# Patient Record
Sex: MALE | Race: OTHER | NOT HISPANIC OR LATINO | URBAN - METROPOLITAN AREA
[De-identification: names, ages, dates, MRNs, and addresses within clinical notes are randomized per-mention and may not be internally consistent; named-entity substitution may affect disease eponyms.]

---

## 2017-05-24 ENCOUNTER — INPATIENT (INPATIENT)
Facility: HOSPITAL | Age: 32
LOS: 1 days | Discharge: HOME | DRG: 103 | End: 2017-05-26
Attending: INTERNAL MEDICINE | Admitting: INTERNAL MEDICINE
Payer: COMMERCIAL

## 2017-05-24 VITALS
HEIGHT: 76.5 IN | WEIGHT: 279.99 LBS | RESPIRATION RATE: 18 BRPM | HEART RATE: 65 BPM | SYSTOLIC BLOOD PRESSURE: 143 MMHG | OXYGEN SATURATION: 97 % | DIASTOLIC BLOOD PRESSURE: 84 MMHG | TEMPERATURE: 98 F

## 2017-05-24 LAB
ALBUMIN SERPL ELPH-MCNC: 4.2 G/DL — SIGNIFICANT CHANGE UP (ref 3.3–5)
ALP SERPL-CCNC: 65 U/L — SIGNIFICANT CHANGE UP (ref 40–120)
ALT FLD-CCNC: 22 U/L — SIGNIFICANT CHANGE UP (ref 10–45)
ANION GAP SERPL CALC-SCNC: 11 MMOL/L — SIGNIFICANT CHANGE UP (ref 5–17)
APTT BLD: 30.6 SEC — SIGNIFICANT CHANGE UP (ref 27.5–37.4)
AST SERPL-CCNC: 23 U/L — SIGNIFICANT CHANGE UP (ref 10–40)
BASOPHILS NFR BLD AUTO: 0 % — SIGNIFICANT CHANGE UP (ref 0–2)
BILIRUB SERPL-MCNC: 0.6 MG/DL — SIGNIFICANT CHANGE UP (ref 0.2–1.2)
BUN SERPL-MCNC: 15 MG/DL — SIGNIFICANT CHANGE UP (ref 7–23)
CALCIUM SERPL-MCNC: 9.8 MG/DL — SIGNIFICANT CHANGE UP (ref 8.4–10.5)
CHLORIDE SERPL-SCNC: 99 MMOL/L — SIGNIFICANT CHANGE UP (ref 96–108)
CHOLEST SERPL-MCNC: 183 MG/DL — SIGNIFICANT CHANGE UP (ref 10–199)
CO2 SERPL-SCNC: 27 MMOL/L — SIGNIFICANT CHANGE UP (ref 22–31)
CREAT SERPL-MCNC: 1.1 MG/DL — SIGNIFICANT CHANGE UP (ref 0.5–1.3)
EOSINOPHIL NFR BLD AUTO: 5.9 % — SIGNIFICANT CHANGE UP (ref 0–6)
GLUCOSE SERPL-MCNC: 89 MG/DL — SIGNIFICANT CHANGE UP (ref 70–99)
HCT VFR BLD CALC: 39.8 % — SIGNIFICANT CHANGE UP (ref 39–50)
HDLC SERPL-MCNC: 49 MG/DL — SIGNIFICANT CHANGE UP (ref 40–125)
HGB BLD-MCNC: 13.6 G/DL — SIGNIFICANT CHANGE UP (ref 13–17)
INR BLD: 1 — SIGNIFICANT CHANGE UP (ref 0.88–1.16)
LIPID PNL WITH DIRECT LDL SERPL: 87 MG/DL — SIGNIFICANT CHANGE UP
LYMPHOCYTES # BLD AUTO: 35.5 % — SIGNIFICANT CHANGE UP (ref 13–44)
MCHC RBC-ENTMCNC: 26.5 PG — LOW (ref 27–34)
MCHC RBC-ENTMCNC: 34.2 G/DL — SIGNIFICANT CHANGE UP (ref 32–36)
MCV RBC AUTO: 77.4 FL — LOW (ref 80–100)
MONOCYTES NFR BLD AUTO: 12 % — SIGNIFICANT CHANGE UP (ref 2–14)
NEUTROPHILS NFR BLD AUTO: 46.6 % — SIGNIFICANT CHANGE UP (ref 43–77)
PLATELET # BLD AUTO: 177 K/UL — SIGNIFICANT CHANGE UP (ref 150–400)
POTASSIUM SERPL-MCNC: 4.1 MMOL/L — SIGNIFICANT CHANGE UP (ref 3.5–5.3)
POTASSIUM SERPL-SCNC: 4.1 MMOL/L — SIGNIFICANT CHANGE UP (ref 3.5–5.3)
PROT SERPL-MCNC: 7 G/DL — SIGNIFICANT CHANGE UP (ref 6–8.3)
PROTHROM AB SERPL-ACNC: 11.1 SEC — SIGNIFICANT CHANGE UP (ref 9.8–12.7)
RBC # BLD: 5.14 M/UL — SIGNIFICANT CHANGE UP (ref 4.2–5.8)
RBC # FLD: 13.3 % — SIGNIFICANT CHANGE UP (ref 10.3–16.9)
SODIUM SERPL-SCNC: 137 MMOL/L — SIGNIFICANT CHANGE UP (ref 135–145)
TOTAL CHOLESTEROL/HDL RATIO MEASUREMENT: 3.7 RATIO — SIGNIFICANT CHANGE UP (ref 3.4–9.6)
TRIGL SERPL-MCNC: 237 MG/DL — HIGH (ref 10–149)
TROPONIN T SERPL-MCNC: <0.01 NG/ML — SIGNIFICANT CHANGE UP (ref 0–0.01)
WBC # BLD: 3.6 K/UL — LOW (ref 3.8–10.5)
WBC # FLD AUTO: 3.6 K/UL — LOW (ref 3.8–10.5)

## 2017-05-24 PROCEDURE — 70496 CT ANGIOGRAPHY HEAD: CPT | Mod: 26

## 2017-05-24 PROCEDURE — 71010: CPT | Mod: 26

## 2017-05-24 PROCEDURE — 99291 CRITICAL CARE FIRST HOUR: CPT | Mod: 25

## 2017-05-24 PROCEDURE — 99291 CRITICAL CARE FIRST HOUR: CPT

## 2017-05-24 PROCEDURE — 70498 CT ANGIOGRAPHY NECK: CPT | Mod: 26

## 2017-05-24 PROCEDURE — 93010 ELECTROCARDIOGRAM REPORT: CPT

## 2017-05-24 RX ORDER — ALTEPLASE 100 MG
81 KIT INTRAVENOUS ONCE
Qty: 0 | Refills: 0 | Status: COMPLETED | OUTPATIENT
Start: 2017-05-24 | End: 2017-05-24

## 2017-05-24 RX ORDER — ALTEPLASE 100 MG
9 KIT INTRAVENOUS ONCE
Qty: 0 | Refills: 0 | Status: COMPLETED | OUTPATIENT
Start: 2017-05-24 | End: 2017-05-24

## 2017-05-24 RX ADMIN — ALTEPLASE 540 MILLIGRAM(S): KIT at 14:35

## 2017-05-24 RX ADMIN — ALTEPLASE 81 MILLIGRAM(S): KIT at 14:38

## 2017-05-24 NOTE — ED PROVIDER NOTE - MEDICAL DECISION MAKING DETAILS
30 yo male right handed male with LUE drift weakness x 30 min - prior CVA Dec 2016  seen by stroke team  neg CT CTA  will proceed with TPA  pt undertands risk and possible compls  wife at bedside

## 2017-05-24 NOTE — ED ADULT NURSE REASSESSMENT NOTE - GASTROINTESTINAL WDL
Abdomen soft, nontender, nondistended

## 2017-05-24 NOTE — ED PROVIDER NOTE - CRANIAL NERVE AND PUPILLARY EXAM
central vision intact/extra-ocular movements intact/gag reflex intact/tongue is midline/peripheral vision intact/cranial nerves 2-12 intact

## 2017-05-24 NOTE — ED ADULT NURSE REASSESSMENT NOTE - INTEGUMENTARY WDL
Color consistent with ethnicity/race, warm, dry intact, resilient.

## 2017-05-24 NOTE — ED PROVIDER NOTE - CRITICAL CARE PROVIDED
consult w/ pt's family directly relating to pts condition/consultation with other physicians/direct patient care (not related to procedure)/documentation/interpretation of diagnostic studies/additional history taking
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
## 2017-05-24 NOTE — H&P ADULT - NSHPSOCIALHISTORY_GEN_ALL_CORE
former smoker (quit 8 years ago, smoked for 5 years), drinks 1-2 times per week, typically Pamela. Per girlfriend at bedside, he drinks about 1 pint each time. Last drink Monday night.

## 2017-05-24 NOTE — ED ADULT NURSE REASSESSMENT NOTE - NURSING NEURO ORIENTATION
oriented to person, place and time

## 2017-05-24 NOTE — ED ADULT NURSE REASSESSMENT NOTE - NURSING NEURO LEVEL OF CONSCIOUSNESS
alert and awake/follows commands
follows commands/alert and awake
alert and awake/follows commands
follows commands/alert and awake
follows commands/alert and awake

## 2017-05-24 NOTE — H&P ADULT - NSHPPHYSICALEXAM_GEN_ALL_CORE
ICU Vital Signs Last 24 Hrs  T(F): 97.3, Max: 97.5 (05-24 @ 13:57)  HR: 66 (48 - 66)  BP: 107/62 (102/56 - 143/84)  BP(mean): 78 (78 - 95)  RR: 38 (16 - 38)  SpO2: 97% (95% - 100%)      General: NAD, comfortable laying in bed  HEENT: NCAT, PERRL, clear conjunctiva, no scleral icterus  Neck: supple, no JVD  Respiratory: CTA b/l, no wheezing, rhonchi, rales  Cardiovascular: RRR, normal S1S2, no M/R/G  Abdomen: soft, NT/ND, bowel sounds in all four quadrants, no palpable masses  Extremities: WWP, no clubbing, cyanosis, or edema  Neuro: ICU Vital Signs Last 24 Hrs  T(F): 97.3, Max: 97.5 (05-24 @ 13:57)  HR: 66 (48 - 66)  BP: 107/62 (102/56 - 143/84)  BP(mean): 78 (78 - 95)  RR: 38 (16 - 38)  SpO2: 97% (95% - 100%)      General: NAD, comfortable laying in bed  HEENT: NCAT, PERRL, clear conjunctiva, no scleral icterus  Neck: supple, no JVD  Respiratory: CTA b/l, no wheezing, rhonchi, rales  Cardiovascular: RRR, normal S1S2, no M/R/G  Abdomen: soft, NT/ND, bowel sounds in all four quadrants, no palpable masses  Extremities: WWP, no clubbing, cyanosis, or edema  Neuro: A&Ox3, strength 5/5 equal bilaterally arms/legs, mild left facial numbness (no weakness), mild left arm numbness (no weakness), left leg numbness resolved, finger to nose intact
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
## 2017-05-24 NOTE — ED ADULT NURSE REASSESSMENT NOTE - RESPIRATORY WDL
Breathing spontaneous and unlabored, equal bilaterally with regular rhythm.

## 2017-05-24 NOTE — H&P ADULT - NSHPLABSRESULTS_GEN_ALL_CORE
13.6   3.6   )-----------( 177      ( 24 May 2017 14:28 )             39.8     05-24    137  |  99  |  15  ----------------------------<  89  4.1   |  27  |  1.10    Ca    9.8      24 May 2017 14:28    TPro  7.0  /  Alb  4.2  /  TBili  0.6  /  DBili  x   /  AST  23  /  ALT  22  /  AlkPhos  65  05-24    PT/INR - ( 24 May 2017 14:28 )   PT: 11.1 sec;   INR: 1.00          PTT - ( 24 May 2017 14:28 )  PTT:30.6 sec
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
## 2017-05-24 NOTE — CONSULT NOTE ADULT - ATTENDING COMMENTS
Patient seen and examined with Resident.  Agree with above.    Patient presented with acute onset of left sided numbness that may be stroke so tpa given after discussion with patient regarding risks and benefits of tPA even if he actually having seizure or complicated migraine.  There was no actual shaking, urine or bowel incontinence or tongue biting to signify seizure.  He did have headache but it was non-specific.  - post tpa protocol.  - will obtain additional information from [facility] regarding his previous episode to determine what further testing is needed.

## 2017-05-24 NOTE — ED ADULT NURSE NOTE - OBJECTIVE STATEMENT
Pt presents to ED c/o numbness. Pt with PMH seizure vs stroke in January at [facility] reports onset of L hand and L foot numbness today at approx 1230 per staff at MD Acosta's office while pt was undergoing cognitive therapy. Pt arrival to ED at 1400, pt upgraded to MD Clark, noted to have mild sensation changes on L side, stroke code called 1408. See nurse reassessment note for stroke code details. At present time pt receiving tPA drip over 1 hour, remains on continuous cardiac monitor, stroke team at bedside. Pt presented with 3/10 HA, at this time pt reports no increase in HA, pt educated about S&S to watch for during tPA administration including worsening HA, nausea, lethargy. Will continue to monitor. Notably pt not on home meds at this time, states "I was supposed to take something when I left the hospital for seizure but I haven't taken it. I don't remember what it was called."

## 2017-05-24 NOTE — ED ADULT NURSE REASSESSMENT NOTE - NS ED NURSE REASSESS COMMENT FT1
1435 MD Dean at bedside, to give tPA. 9mg IVP bolus given at 1435, 81 mg drip hung 1438 by RUTHIE Garcia. Pt lying flat on continuous cardiac monitor at this time. Pt educated about S&S to watch out for including worsening HA, nausea, difficulty swallowing. Will continue to monitor.
Pt arrival to ED at 1400 reporting onset of L sided tingling in L foot and L hand PTA while doing "cognitive testing at Dr. Acosta's office." On arrival to ED pt reporting decreased L sided sensation to LUE and LLE. Per pt "I was seen at [facility] in January and they told me I had a seizure and a stroke so I'm not sure." Stroke code called 1408, FS 90, stroke team arrival 1409, pt transported to CT 1411, CT non contrast preformed, 18G IV placed by RUTHIE Velázquez and labs drawn and sent crisis at 1420, CTA preformed. Pt brought back to [facility] 2 at 1425, being placed on continuous cardiac monitor at this time. EKG in progress. MD Dean and stroke team at bedside at this time obtaining further history from girlfriend and pt, to determine if to give tPA. Pt in NAD at this time, VSS. Will continue to monitor.
Pt reporting increased blurry vision and dizziness while on tPA, MD Clark and MD Dean aware. Per MD Dean to continue tPA administration. Pt with increased sensation to LUE and LLE, states "it was about 75% before now it's up to 95% in my arm and leg." Pt remains A/Ox3 and PERRLA, no further motor or sensor changes. Family at bedside, pt pending tele bed assignment. Will continue to monitor.
tPA administration complete. Pt remains A/Ox3, PERRLA, motor and sensation fully intact x4 extremities. Pt educated about lying flat post tPA administration. Pt remains on continous cardiac monitor pending clean 7 east bed. Will continue to monitor.
Pt remains A/Ox3, PERRLA, motor and sensation in tact x4 extremities. Pt with clean bed on 7 East, to call report and prepare for transport.
Pt remains with 3/10 HA, reports blurry vision has somewhat resolved since onset. Pt states "my LUE and RUE feel the same now, and my LLE and RLE feel the same." Stroke team at bedside at this time. Pt pending 7East bed assignment. Will continue to monitor.

## 2017-05-24 NOTE — ED ADULT NURSE REASSESSMENT NOTE - PSYCHOSOCIAL WDL
Alert and oriented x 3, normal mood and affect, no apparent risk to self or others.

## 2017-05-24 NOTE — ED ADULT NURSE REASSESSMENT NOTE - GENITOURINARY WDL
Bladder non-tender and non-distended. Urine clear yellow.

## 2017-05-24 NOTE — ED ADULT TRIAGE NOTE - ARRIVAL INFO ADDITIONAL COMMENTS
Pt is A&O x 3, pt is able to speak coherently in full sentences and ambulate with steady gait. No facial droop. left side weakness noted. Pt presented to Ed with left side numbness x 30min. Pt is A&O x 3, pt is able to speak coherently in full sentences and ambulate with steady gait. No facial droop. left side weakness noted.

## 2017-05-25 LAB
ANION GAP SERPL CALC-SCNC: 15 MMOL/L — SIGNIFICANT CHANGE UP (ref 5–17)
BLD GP AB SCN SERPL QL: NEGATIVE — SIGNIFICANT CHANGE UP
BLD GP AB SCN SERPL QL: NEGATIVE — SIGNIFICANT CHANGE UP
BUN SERPL-MCNC: 14 MG/DL — SIGNIFICANT CHANGE UP (ref 7–23)
CALCIUM SERPL-MCNC: 9.2 MG/DL — SIGNIFICANT CHANGE UP (ref 8.4–10.5)
CHLORIDE SERPL-SCNC: 100 MMOL/L — SIGNIFICANT CHANGE UP (ref 96–108)
CHOLEST SERPL-MCNC: 186 MG/DL — SIGNIFICANT CHANGE UP (ref 10–199)
CO2 SERPL-SCNC: 24 MMOL/L — SIGNIFICANT CHANGE UP (ref 22–31)
CREAT SERPL-MCNC: 1 MG/DL — SIGNIFICANT CHANGE UP (ref 0.5–1.3)
CRP SERPL-MCNC: 0.1 MG/DL — SIGNIFICANT CHANGE UP (ref 0–0.4)
GLUCOSE SERPL-MCNC: 126 MG/DL — HIGH (ref 70–99)
HBA1C BLD-MCNC: 5.3 % — SIGNIFICANT CHANGE UP (ref 4–5.6)
HCT VFR BLD CALC: 42.9 % — SIGNIFICANT CHANGE UP (ref 39–50)
HDLC SERPL-MCNC: 50 MG/DL — SIGNIFICANT CHANGE UP (ref 40–125)
HGB BLD-MCNC: 14.2 G/DL — SIGNIFICANT CHANGE UP (ref 13–17)
LIPID PNL WITH DIRECT LDL SERPL: 109 MG/DL — SIGNIFICANT CHANGE UP
MAGNESIUM SERPL-MCNC: 2.2 MG/DL — SIGNIFICANT CHANGE UP (ref 1.6–2.6)
MCHC RBC-ENTMCNC: 25.6 PG — LOW (ref 27–34)
MCHC RBC-ENTMCNC: 33.1 G/DL — SIGNIFICANT CHANGE UP (ref 32–36)
MCV RBC AUTO: 77.3 FL — LOW (ref 80–100)
PHOSPHATE SERPL-MCNC: 4 MG/DL — SIGNIFICANT CHANGE UP (ref 2.5–4.5)
PLATELET # BLD AUTO: 183 K/UL — SIGNIFICANT CHANGE UP (ref 150–400)
POTASSIUM SERPL-MCNC: 4.3 MMOL/L — SIGNIFICANT CHANGE UP (ref 3.5–5.3)
POTASSIUM SERPL-SCNC: 4.3 MMOL/L — SIGNIFICANT CHANGE UP (ref 3.5–5.3)
RBC # BLD: 5.55 M/UL — SIGNIFICANT CHANGE UP (ref 4.2–5.8)
RBC # FLD: 13.3 % — SIGNIFICANT CHANGE UP (ref 10.3–16.9)
RH IG SCN BLD-IMP: POSITIVE — SIGNIFICANT CHANGE UP
RH IG SCN BLD-IMP: POSITIVE — SIGNIFICANT CHANGE UP
SODIUM SERPL-SCNC: 139 MMOL/L — SIGNIFICANT CHANGE UP (ref 135–145)
TOTAL CHOLESTEROL/HDL RATIO MEASUREMENT: 3.7 RATIO — SIGNIFICANT CHANGE UP (ref 3.4–9.6)
TRIGL SERPL-MCNC: 136 MG/DL — SIGNIFICANT CHANGE UP (ref 10–149)
TSH SERPL-MCNC: 0.88 UIU/ML — SIGNIFICANT CHANGE UP (ref 0.35–4.94)
WBC # BLD: 3.2 K/UL — LOW (ref 3.8–10.5)
WBC # FLD AUTO: 3.2 K/UL — LOW (ref 3.8–10.5)

## 2017-05-25 PROCEDURE — 93306 TTE W/DOPPLER COMPLETE: CPT | Mod: 26

## 2017-05-25 PROCEDURE — 99233 SBSQ HOSP IP/OBS HIGH 50: CPT

## 2017-05-25 PROCEDURE — 70450 CT HEAD/BRAIN W/O DYE: CPT | Mod: 26

## 2017-05-25 RX ORDER — ASPIRIN/CALCIUM CARB/MAGNESIUM 324 MG
81 TABLET ORAL DAILY
Qty: 0 | Refills: 0 | Status: DISCONTINUED | OUTPATIENT
Start: 2017-05-25 | End: 2017-05-26

## 2017-05-25 RX ORDER — ATORVASTATIN CALCIUM 80 MG/1
80 TABLET, FILM COATED ORAL AT BEDTIME
Qty: 0 | Refills: 0 | Status: DISCONTINUED | OUTPATIENT
Start: 2017-05-25 | End: 2017-05-26

## 2017-05-25 RX ORDER — HEPARIN SODIUM 5000 [USP'U]/ML
5000 INJECTION INTRAVENOUS; SUBCUTANEOUS EVERY 8 HOURS
Qty: 0 | Refills: 0 | Status: DISCONTINUED | OUTPATIENT
Start: 2017-05-25 | End: 2017-05-26

## 2017-05-25 RX ORDER — ACETAMINOPHEN 500 MG
650 TABLET ORAL EVERY 6 HOURS
Qty: 0 | Refills: 0 | Status: DISCONTINUED | OUTPATIENT
Start: 2017-05-25 | End: 2017-05-26

## 2017-05-25 RX ADMIN — ATORVASTATIN CALCIUM 80 MILLIGRAM(S): 80 TABLET, FILM COATED ORAL at 22:43

## 2017-05-25 RX ADMIN — Medication 650 MILLIGRAM(S): at 22:46

## 2017-05-25 RX ADMIN — Medication 81 MILLIGRAM(S): at 22:56

## 2017-05-25 RX ADMIN — HEPARIN SODIUM 5000 UNIT(S): 5000 INJECTION INTRAVENOUS; SUBCUTANEOUS at 22:43
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
## 2017-05-25 NOTE — PHYSICAL THERAPY INITIAL EVALUATION ADULT - PERTINENT HX OF CURRENT PROBLEM, REHAB EVAL
30 yo male admitted for complaints of numbness LUE/LLE tPA administered in [facility] ED 5/24/17 2:35pm, Patient seen for PT follow up

## 2017-05-25 NOTE — PROGRESS NOTE ADULT - ATTENDING COMMENTS
Patient seen and examined with NP.  Agree with above.  New complaint late afternoon of blurry vision in both eyes.  Requested Ophthalmology consult but they aren't available at this time.  Patient in for MRI Brain without sabine that may be performed overnight so will monitor overnight.    If his symptoms improve, will discharge in AM with possibly outpatient studies.    Discussed compliance with his medications.
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
## 2017-05-25 NOTE — PHYSICAL THERAPY INITIAL EVALUATION ADULT - PLANNED THERAPY INTERVENTIONS, PT EVAL
gait training/balance training/stair negotiation/bed mobility training/transfer training/strengthening

## 2017-05-25 NOTE — PHYSICAL THERAPY INITIAL EVALUATION ADULT - MODALITIES TREATMENT COMMENTS
Visual fields intact all four quadrants; "H" test intact, facial muscle symmetrical, shoulder shrug intact, hearing intact to finger rub bilateral, denies visual disturbances and hypoesthesia all four extremities MRS #1

## 2017-05-26 VITALS — TEMPERATURE: 98 F

## 2017-05-26 LAB
ANION GAP SERPL CALC-SCNC: 12 MMOL/L — SIGNIFICANT CHANGE UP (ref 5–17)
B2 MICROGLOB SERPL-MCNC: 1.4 MG/L — SIGNIFICANT CHANGE UP (ref 0.8–2.2)
BUN SERPL-MCNC: 18 MG/DL — SIGNIFICANT CHANGE UP (ref 7–23)
CALCIUM SERPL-MCNC: 9.3 MG/DL — SIGNIFICANT CHANGE UP (ref 8.4–10.5)
CHLORIDE SERPL-SCNC: 101 MMOL/L — SIGNIFICANT CHANGE UP (ref 96–108)
CO2 SERPL-SCNC: 27 MMOL/L — SIGNIFICANT CHANGE UP (ref 22–31)
CREAT SERPL-MCNC: 1 MG/DL — SIGNIFICANT CHANGE UP (ref 0.5–1.3)
CRP SERPL-MCNC: 0.2 MG/DL — SIGNIFICANT CHANGE UP (ref 0–0.4)
ERYTHROCYTE [SEDIMENTATION RATE] IN BLOOD: 3 MM/HR — SIGNIFICANT CHANGE UP
GLUCOSE SERPL-MCNC: 93 MG/DL — SIGNIFICANT CHANGE UP (ref 70–99)
HCT VFR BLD CALC: 40.3 % — SIGNIFICANT CHANGE UP (ref 39–50)
HGB BLD-MCNC: 13.7 G/DL — SIGNIFICANT CHANGE UP (ref 13–17)
MAGNESIUM SERPL-MCNC: 2.2 MG/DL — SIGNIFICANT CHANGE UP (ref 1.6–2.6)
MCHC RBC-ENTMCNC: 26.2 PG — LOW (ref 27–34)
MCHC RBC-ENTMCNC: 34 G/DL — SIGNIFICANT CHANGE UP (ref 32–36)
MCV RBC AUTO: 77.2 FL — LOW (ref 80–100)
PLATELET # BLD AUTO: 167 K/UL — SIGNIFICANT CHANGE UP (ref 150–400)
POTASSIUM SERPL-MCNC: 4.7 MMOL/L — SIGNIFICANT CHANGE UP (ref 3.5–5.3)
POTASSIUM SERPL-SCNC: 4.7 MMOL/L — SIGNIFICANT CHANGE UP (ref 3.5–5.3)
RBC # BLD: 5.22 M/UL — SIGNIFICANT CHANGE UP (ref 4.2–5.8)
RBC # FLD: 13.3 % — SIGNIFICANT CHANGE UP (ref 10.3–16.9)
SODIUM SERPL-SCNC: 140 MMOL/L — SIGNIFICANT CHANGE UP (ref 135–145)
WBC # BLD: 3.1 K/UL — LOW (ref 3.8–10.5)
WBC # FLD AUTO: 3.1 K/UL — LOW (ref 3.8–10.5)

## 2017-05-26 PROCEDURE — 36415 COLL VENOUS BLD VENIPUNCTURE: CPT

## 2017-05-26 PROCEDURE — 80048 BASIC METABOLIC PNL TOTAL CA: CPT

## 2017-05-26 PROCEDURE — 85730 THROMBOPLASTIN TIME PARTIAL: CPT

## 2017-05-26 PROCEDURE — 85652 RBC SED RATE AUTOMATED: CPT

## 2017-05-26 PROCEDURE — 86160 COMPLEMENT ANTIGEN: CPT

## 2017-05-26 PROCEDURE — 80061 LIPID PANEL: CPT

## 2017-05-26 PROCEDURE — 86225 DNA ANTIBODY NATIVE: CPT

## 2017-05-26 PROCEDURE — 82232 ASSAY OF BETA-2 PROTEIN: CPT

## 2017-05-26 PROCEDURE — 86850 RBC ANTIBODY SCREEN: CPT

## 2017-05-26 PROCEDURE — 84100 ASSAY OF PHOSPHORUS: CPT

## 2017-05-26 PROCEDURE — 96374 THER/PROPH/DIAG INJ IV PUSH: CPT | Mod: XU

## 2017-05-26 PROCEDURE — 83036 HEMOGLOBIN GLYCOSYLATED A1C: CPT

## 2017-05-26 PROCEDURE — 70496 CT ANGIOGRAPHY HEAD: CPT

## 2017-05-26 PROCEDURE — 70450 CT HEAD/BRAIN W/O DYE: CPT

## 2017-05-26 PROCEDURE — 93306 TTE W/DOPPLER COMPLETE: CPT

## 2017-05-26 PROCEDURE — 85027 COMPLETE CBC AUTOMATED: CPT

## 2017-05-26 PROCEDURE — 80053 COMPREHEN METABOLIC PANEL: CPT

## 2017-05-26 PROCEDURE — 93005 ELECTROCARDIOGRAM TRACING: CPT

## 2017-05-26 PROCEDURE — 86140 C-REACTIVE PROTEIN: CPT

## 2017-05-26 PROCEDURE — 86200 CCP ANTIBODY: CPT

## 2017-05-26 PROCEDURE — 84484 ASSAY OF TROPONIN QUANT: CPT

## 2017-05-26 PROCEDURE — 71045 X-RAY EXAM CHEST 1 VIEW: CPT

## 2017-05-26 PROCEDURE — 83735 ASSAY OF MAGNESIUM: CPT

## 2017-05-26 PROCEDURE — 70498 CT ANGIOGRAPHY NECK: CPT

## 2017-05-26 PROCEDURE — 99238 HOSP IP/OBS DSCHRG MGMT 30/<: CPT

## 2017-05-26 PROCEDURE — 99291 CRITICAL CARE FIRST HOUR: CPT | Mod: 25

## 2017-05-26 PROCEDURE — 97161 PT EVAL LOW COMPLEX 20 MIN: CPT

## 2017-05-26 PROCEDURE — 85610 PROTHROMBIN TIME: CPT

## 2017-05-26 PROCEDURE — 84443 ASSAY THYROID STIM HORMONE: CPT

## 2017-05-26 PROCEDURE — 86900 BLOOD TYPING SEROLOGIC ABO: CPT

## 2017-05-26 PROCEDURE — 85025 COMPLETE CBC W/AUTO DIFF WBC: CPT

## 2017-05-26 PROCEDURE — 86901 BLOOD TYPING SEROLOGIC RH(D): CPT

## 2017-05-26 PROCEDURE — 86038 ANTINUCLEAR ANTIBODIES: CPT

## 2017-05-26 RX ORDER — ASPIRIN/CALCIUM CARB/MAGNESIUM 324 MG
1 TABLET ORAL
Qty: 30 | Refills: 0 | OUTPATIENT
Start: 2017-05-26 | End: 2017-06-25

## 2017-05-26 RX ADMIN — HEPARIN SODIUM 5000 UNIT(S): 5000 INJECTION INTRAVENOUS; SUBCUTANEOUS at 08:00

## 2017-05-26 RX ADMIN — Medication 650 MILLIGRAM(S): at 08:41

## 2017-05-26 NOTE — DISCHARGE NOTE ADULT - CARE PROVIDERS DIRECT ADDRESSES
,[email or web address],DirectAddress_Unknown

## 2017-05-26 NOTE — DISCHARGE NOTE ADULT - PLAN OF CARE
Discharge home, follow up with Dr. Dean in 2 weeks. You received a strong blood thinner called tPA, were admitted to intensive care unit for monitoring. Your cholesterol levels were normal, no evidence you had a stroke. Please take daily aspirin 81mg daily and follow with Dr. Dean. Please follow up with Dr. Cobb. You received a strong blood thinner called tPA, were admitted to intensive care unit for monitoring. Your cholesterol levels were normal, no evidence you had a stroke. Please take daily aspirin 81mg daily and follow with Dr. Dean for MRI brain outside the hospital, and for physical therapy.
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
## 2017-05-26 NOTE — DISCHARGE NOTE ADULT - PROVIDER TOKENS
TOKEN:'[id number]',TOKEN:'[id number]' TOKEN:'[id number]:'[id number]',FREE:[LAST:[[facility]],PHONE:[phone]],FAX:[(   )    -],ADDRESS:[Primary Care  [address] [place] [address], [address] [place] [address]]]

## 2017-05-26 NOTE — CONSULT NOTE ADULT - SUBJECTIVE AND OBJECTIVE BOX
Pt complained of intermittent blurry vision in both eyes.  As per the medical intern doctor, visual acuity was 20/20.  Visual field was normal on confrontation test.  Pupil was round, symmetric and reactive OU.  No corneal cloudiness OU.  Iris details were visible OU.  EOM was full and normal OU.    Pt was in the ICU and could not be transferred to the ophthalmological exam room in the 2F.

## 2017-05-26 NOTE — DISCHARGE NOTE ADULT - CARE PROVIDER_API CALL
Mary Dean), Neurology  [address] [address]  Phone: [phone]  Fax: [phone]    Jon Cobb), [facility]  [address] [address]  Phone: [phone]  Fax: [phone] Mary Dean), Neurology  [address] [address]  Phone: [phone]  Fax: [phone]    Jon Cobb), [facility]  [address] [address]  Phone: [phone]  Fax: [phone]    [facility],   Primary Care  [address], [address] [place] [address]  Phone: [phone]  Fax: (   )    -

## 2017-05-26 NOTE — DISCHARGE NOTE ADULT - ADDITIONAL INSTRUCTIONS
Neurology: Please call to make appointment with Dr. Dean (neurologist) in 1-2 weeks.  Ophthalmology: Please call to make appointment with Dr. Cobb (ophthalmology) in 1-2 weeks. Neurology: Please call to make appointment with Dr. Dean (neurologist) in 1-2 weeks.  Ophthalmology: Please call to make appointment with Dr. Cobb (ophthalmology) in 1-2 weeks.  Primary care: Please call to make appointment with [facility] in 1-2 weeks ([facility]).

## 2017-05-26 NOTE — DISCHARGE NOTE ADULT - PATIENT PORTAL LINK FT
“You can access the FollowHealth Patient Portal, offered by [facility], by registering with the following website: [email or web address]”
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
## 2017-05-26 NOTE — DISCHARGE NOTE ADULT - CARE PLAN
Principal Discharge DX:	Migraine variant  Goal:	Discharge home, follow up with Dr. Dean in 2 weeks.  Instructions for follow-up, activity and diet:	You received a strong blood thinner called tPA, were admitted to intensive care unit for monitoring. Your cholesterol levels were normal, no evidence you had a stroke. Please take daily aspirin 81mg daily and follow with Dr. Dean.  Secondary Diagnosis:	Blurry vision, bilateral  Instructions for follow-up, activity and diet:	Please follow up with Dr. Cobb. Principal Discharge DX:	Migraine variant  Goal:	Discharge home, follow up with Dr. Dean in 2 weeks.  Instructions for follow-up, activity and diet:	You received a strong blood thinner called tPA, were admitted to intensive care unit for monitoring. Your cholesterol levels were normal, no evidence you had a stroke. Please take daily aspirin 81mg daily and follow with Dr. Dean for MRI brain outside the hospital, and for physical therapy.  Secondary Diagnosis:	Blurry vision, bilateral  Instructions for follow-up, activity and diet:	Please follow up with Dr. Cobb.

## 2017-05-26 NOTE — CONSULT NOTE ADULT - ASSESSMENT
No abnormality in the eyeball OU based on the examination in the ICU.  If blurred vision recurs, contact [facility] for further evaluation ([phone]).

## 2017-05-26 NOTE — DISCHARGE NOTE ADULT - MEDICATION SUMMARY - MEDICATIONS TO TAKE
I will START or STAY ON the medications listed below when I get home from the hospital:    aspirin 81 mg oral tablet, chewable  -- 1 tab(s) by mouth once a day  -- Indication: For Migraine variant

## 2017-05-27 LAB
ANA PAT FLD IF-IMP: (no result)
ANA TITR SER: (no result)

## 2017-05-28 LAB
CCP IGG SERPL-ACNC: <8 UNITS — SIGNIFICANT CHANGE UP (ref 0–19)
DSDNA AB SER-ACNC: <12 IU/ML — SIGNIFICANT CHANGE UP
RF+CCP IGG SER-IMP: NEGATIVE — SIGNIFICANT CHANGE UP

## 2017-05-29 LAB
AUTO DIFF PNL BLD: NEGATIVE — SIGNIFICANT CHANGE UP
C-ANCA SER-ACNC: NEGATIVE — SIGNIFICANT CHANGE UP
CARDIOLIPIN AB SER-ACNC: NEGATIVE — SIGNIFICANT CHANGE UP
P-ANCA SER-ACNC: NEGATIVE — SIGNIFICANT CHANGE UP

## 2017-05-30 DIAGNOSIS — G43.109 MIGRAINE WITH AURA, NOT INTRACTABLE, WITHOUT STATUS MIGRAINOSUS: ICD-10-CM

## 2017-05-30 DIAGNOSIS — Z87.891 PERSONAL HISTORY OF NICOTINE DEPENDENCE: ICD-10-CM

## 2017-05-30 DIAGNOSIS — Z86.73 PERSONAL HISTORY OF TRANSIENT ISCHEMIC ATTACK (TIA), AND CEREBRAL INFARCTION WITHOUT RESIDUAL DEFICITS: ICD-10-CM

## 2017-05-30 DIAGNOSIS — Z88.2 ALLERGY STATUS TO SULFONAMIDES: ICD-10-CM

## 2017-05-30 DIAGNOSIS — Z91.013 ALLERGY TO SEAFOOD: ICD-10-CM

## 2017-05-30 LAB
C3 SERPL-MCNC: 213 MG/DL — HIGH (ref 80–180)
C4 SERPL-MCNC: 41 MG/DL — SIGNIFICANT CHANGE UP (ref 10–45)

## 2017-11-07 NOTE — PHYSICAL THERAPY INITIAL EVALUATION ADULT - ORIENTATION, REHAB EVAL
Group Topic: Symptom Management    Start Time: 10:00 AM  End Time: 11:00 AM    Focus: Family Dysfunction  Number in attendance: 10    The lecture discusses how being raised in a dysfunctional family can create certain symptoms that can manifest in personality disorders. This not only looks at issues with family of origin but also their own family and children      Patient Response: Minimally attentive    Pt was minimally attentive during the lecture.     oriented to person, place, time and situation

## 2019-07-17 NOTE — DISCHARGE NOTE ADULT - LAUNCH MEDICATION RECONCILIATION
Neurosurgery consulted by ED and is aware patient's case.  Patient does have some mild Neuro impairment with unsteady gait and generalized weakness all extremities on my exam and I have notified Neuro surgery at this time.  Awaiting their response/recommendations.  Patient was started on vancomycin in ED will increase coverage with meropenem.  Given his allergy and findings of osteo on MRI.  Consider addition of nafcillin to regimen pending course.    Will consult ID  Neuro checks  Further evaluation/diagnostics/interventions/consults pending course     07/16- will continue vancomycin / and use Aztreonam -will use cultures to guide therapy      <<-----Click here for Discharge Medication Review

## 2019-10-04 NOTE — ED PROVIDER NOTE - NEUROLOGICAL SENSATION
Vaccine Information Statement(s) was given today. This has been reviewed, questions answered, and verbal consent given by Patient for injection(s) and administration of Pneumococcal Conjugate (PCV13).      Patient tolerated without incident. See immunization grid for documentation.    
present and normal in 4 extremities

## 2022-12-20 NOTE — ED ADULT NURSE NOTE - NS ED TRIAGE CLINICAL UPGRADE PROVIDER FT
MD Clark Tranexamic Acid Pregnancy And Lactation Text: It is unknown if this medication is safe during pregnancy or breast feeding.
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
## 2024-04-15 NOTE — PHYSICAL THERAPY INITIAL EVALUATION ADULT - PHYSICAL ASSIST/NONPHYSICAL ASSIST: SUPINE/SIT, REHAB EVAL

## 2025-03-31 NOTE — ED ADULT NURSE NOTE - E[facility]
Patient viewed general PAT education video as instructed in their preoperative information received from their surgeon.  Patient stated the general PAT education video was viewed in its entirety and survey completed.  Copies of PAT general education handouts (Incentive Spirometry, Meds to Beds Program, Patient Belongings, Pre-op skin preparation instructions, Blood Glucose testing, Visitor policy, Surgery FAQ, Code H) distributed to patient if not printed. Education related to the PAT pass and skin preparation for surgery (if applicable) completed in PAT as a reinforcement to PAT education video. Patient instructed to return PAT pass provided today as well as completed skin preparation sheet (if applicable) on the day of procedure.     Additionally if patient had not viewed video yet but intended to view it at home or in our waiting area, then referred them to the handout with QR code/link provided during PAT visit.  Encouraged patient/family to read Located within [facility] general education handouts thoroughly and notify PAT staff with any questions or concerns. Patient verbalized understanding of all information and priority content.    An arrival time for procedure was not provided during PAT visit. If patient had any questions or concerns about their arrival time, they were instructed to contact their surgeon/physician.  Additionally, if the patient referred to an arrival time that was acquired from their my chart account, patient was encouraged to verify that time with their surgeon/physician. Arrival times are NOT provided in Pre Admission Testing Department.    Patient to apply Chlorhexadine wipes  to surgical area (as instructed) the night before procedure and the AM of procedure. Wipes provided.    Patient denies any current skin issues.     Patient instructed to drink 20 ounces of Gatorade or Gatorlyte (if diabetic) and it needs to be completed 1 hour (for Main OR patients) or 2 hours ([facility]) before given arrival time for procedure (NO RED Gatorade and NO Gatorade Zero).    Patient verbalized understanding.    Instructed patient to take two Tylenol extra strength (total of 1000 mg) the night before surgery.    Post-Surgery Information Instruction Sheet given to patient during Pre-Admission Testing Visit with verbal instructions to patient to return with PAT PASS on the day of surgery. Additionally, encouraged patient to review the information provided.    Blood bank bracelet applied to patient during Pre Admission Testing visit.  Patient instructed not to remove from arm until after procedure and they are discharged from the hospital.  Explained to patient that they may shower and get the bracelet wet, but not to immerse under water for longer periods (bathing, swimming, hand dishwashing, etc).  Patient verbalized understanding.       Eyes with no visual disturbances.  Ears clean and dry and no hearing difficulties. Nose with pink mucosa and no drainage.  Mouth mucous membranes moist and pink.  No tenderness or swelling to throat or neck.